# Patient Record
Sex: MALE | Race: WHITE | NOT HISPANIC OR LATINO | Employment: FULL TIME | URBAN - METROPOLITAN AREA
[De-identification: names, ages, dates, MRNs, and addresses within clinical notes are randomized per-mention and may not be internally consistent; named-entity substitution may affect disease eponyms.]

---

## 2017-12-20 ENCOUNTER — OFFICE VISIT (OUTPATIENT)
Dept: URGENT CARE | Facility: CLINIC | Age: 22
End: 2017-12-20
Payer: COMMERCIAL

## 2017-12-20 VITALS
SYSTOLIC BLOOD PRESSURE: 114 MMHG | OXYGEN SATURATION: 97 % | HEART RATE: 74 BPM | DIASTOLIC BLOOD PRESSURE: 64 MMHG | RESPIRATION RATE: 14 BRPM | BODY MASS INDEX: 26.66 KG/M2 | HEIGHT: 69 IN | TEMPERATURE: 98 F | WEIGHT: 180 LBS

## 2017-12-20 DIAGNOSIS — L72.3 INFLAMED SEBACEOUS CYST: ICD-10-CM

## 2017-12-20 PROCEDURE — 99202 OFFICE O/P NEW SF 15 MIN: CPT | Performed by: EMERGENCY MEDICINE

## 2017-12-20 RX ORDER — CEPHALEXIN 500 MG/1
1000 CAPSULE ORAL 3 TIMES DAILY
Qty: 42 CAP | Refills: 0 | Status: SHIPPED | OUTPATIENT
Start: 2017-12-20 | End: 2017-12-27

## 2017-12-20 RX ORDER — IBUPROFEN 200 MG
200 TABLET ORAL EVERY 6 HOURS PRN
COMMUNITY

## 2017-12-20 ASSESSMENT — ENCOUNTER SYMPTOMS
PARESTHESIAS: 0
FEVER: 0
SENSORY CHANGE: 0
FOCAL WEAKNESS: 0
BACK PAIN: 1
ABDOMINAL PAIN: 0

## 2017-12-20 NOTE — PROGRESS NOTES
"Subjective:      James Gonzalez is a 22 y.o. male who presents with Back Pain (thoracic back pain x 2 days .  feels like he may have pulled a muscle working out .)            Back Pain   This is a new problem. Episode onset: 2 days. The problem is unchanged. The pain is present in the thoracic spine. Quality: tender. The pain is mild. Pertinent negatives include no abdominal pain, chest pain, fever or paresthesias. He has tried nothing for the symptoms.   Notes a pre-existing lump at the upper thoracic region. Worsened after weight lifting bar resting on the area during exercise. Also notes manipulated the area somewhat recently.    Review of Systems   Constitutional: Negative for fever.   Cardiovascular: Negative for chest pain.   Gastrointestinal: Negative for abdominal pain.   Musculoskeletal: Positive for back pain.   Skin: Negative for itching and rash.   Neurological: Negative for sensory change, focal weakness and paresthesias.     PMH:  has no past medical history on file.  MEDS:   Current Outpatient Prescriptions:   •  ibuprofen (MOTRIN) 200 MG Tab, Take 200 mg by mouth every 6 hours as needed., Disp: , Rfl:   •  cephALEXin (KEFLEX) 500 MG Cap, Take 2 Caps by mouth 3 times a day for 7 days., Disp: 42 Cap, Rfl: 0  ALLERGIES: No Known Allergies  SURGHX: History reviewed. No pertinent surgical history.  SOCHX:    FH: family history is not on file.       Objective:     /64   Pulse 74   Temp 36.7 °C (98 °F)   Resp 14   Ht 1.753 m (5' 9\")   Wt 81.6 kg (180 lb)   SpO2 97%   BMI 26.58 kg/m²      Physical Exam   Constitutional: Vital signs are normal. He appears well-developed and well-nourished.   Cardiovascular: Normal rate, regular rhythm and normal heart sounds.    Pulmonary/Chest: Effort normal and breath sounds normal.   Abdominal: There is no CVA tenderness.   Musculoskeletal:        Cervical back: He exhibits normal range of motion and no bony tenderness.   Skin:        Psychiatric: He has " a normal mood and affect.               Assessment/Plan:     1. Inflamed sebaceous cyst  Advised warm compresses, avoid manipulating the site.  Advised to use antibiotic if not resolving; recheck in 2-3 days if used and not improving.  - cephALEXin (KEFLEX) 500 MG Cap; Take 2 Caps by mouth 3 times a day for 7 days.  Dispense: 42 Cap; Refill: 0

## 2017-12-20 NOTE — PATIENT INSTRUCTIONS
Use over-the-counter pain reliever, such as acetaminophen (Tylenol), ibuprofen (Advil, Motrin) or naproxen (Aleve) as needed; follow package directions for dosing.  Warm compresses as needed; avoid pressure on the site.  Use the prescribed antibiotic if symptoms persist 2-3 days or re-worsen; if used, recheck in 2-3 days if not resolving.    Epidermal Cyst  An epidermal cyst is usually a small, painless lump under the skin. Cysts often occur on the face, neck, stomach, chest, or genitals. The cyst may be filled with a bad smelling paste. Do not pop your cyst. Popping the cyst can cause pain and puffiness (swelling).  HOME CARE   · Only take medicines as told by your doctor.  · Take your medicine (antibiotics) as told. Finish it even if you start to feel better.  GET HELP RIGHT AWAY IF:  · Your cyst is tender, red, or puffy.  · You are not getting better, or you are getting worse.  · You have any questions or concerns.  MAKE SURE YOU:  · Understand these instructions.  · Will watch your condition.  · Will get help right away if you are not doing well or get worse.     This information is not intended to replace advice given to you by your health care provider. Make sure you discuss any questions you have with your health care provider.     Document Released: 01/25/2006 Document Revised: 06/18/2013 Document Reviewed: 06/25/2012  [employer] Interactive Patient Education ©2016 [employer]

## 2024-10-25 ENCOUNTER — OFFICE VISIT (OUTPATIENT)
Dept: URGENT CARE | Facility: CLINIC | Age: 29
End: 2024-10-25
Payer: COMMERCIAL

## 2024-10-25 VITALS
HEIGHT: 68 IN | BODY MASS INDEX: 29.25 KG/M2 | HEART RATE: 126 BPM | DIASTOLIC BLOOD PRESSURE: 84 MMHG | WEIGHT: 193 LBS | OXYGEN SATURATION: 94 % | TEMPERATURE: 98.3 F | SYSTOLIC BLOOD PRESSURE: 114 MMHG | RESPIRATION RATE: 16 BRPM

## 2024-10-25 DIAGNOSIS — L03.90 CELLULITIS, UNSPECIFIED CELLULITIS SITE: ICD-10-CM

## 2024-10-25 PROCEDURE — 3074F SYST BP LT 130 MM HG: CPT | Performed by: PHYSICIAN ASSISTANT

## 2024-10-25 PROCEDURE — 99203 OFFICE O/P NEW LOW 30 MIN: CPT | Performed by: PHYSICIAN ASSISTANT

## 2024-10-25 PROCEDURE — 3079F DIAST BP 80-89 MM HG: CPT | Performed by: PHYSICIAN ASSISTANT

## 2024-10-25 RX ORDER — SULFAMETHOXAZOLE AND TRIMETHOPRIM 800; 160 MG/1; MG/1
1 TABLET ORAL 2 TIMES DAILY
Qty: 14 TABLET | Refills: 0 | Status: SHIPPED | OUTPATIENT
Start: 2024-10-25 | End: 2024-11-01

## 2024-10-25 ASSESSMENT — ENCOUNTER SYMPTOMS
CHILLS: 0
TINGLING: 0
FEVER: 0
WEAKNESS: 0